# Patient Record
Sex: FEMALE | Race: WHITE | Employment: OTHER | URBAN - METROPOLITAN AREA
[De-identification: names, ages, dates, MRNs, and addresses within clinical notes are randomized per-mention and may not be internally consistent; named-entity substitution may affect disease eponyms.]

---

## 2018-02-22 ENCOUNTER — OFFICE VISIT (OUTPATIENT)
Dept: CARDIOLOGY CLINIC | Age: 73
End: 2018-02-22

## 2018-02-22 VITALS
BODY MASS INDEX: 28.13 KG/M2 | OXYGEN SATURATION: 98 % | WEIGHT: 149 LBS | HEIGHT: 61 IN | DIASTOLIC BLOOD PRESSURE: 60 MMHG | SYSTOLIC BLOOD PRESSURE: 100 MMHG | HEART RATE: 81 BPM | RESPIRATION RATE: 16 BRPM

## 2018-02-22 DIAGNOSIS — N18.30 STAGE 3 CHRONIC KIDNEY DISEASE (HCC): ICD-10-CM

## 2018-02-22 DIAGNOSIS — I10 ESSENTIAL HYPERTENSION: ICD-10-CM

## 2018-02-22 DIAGNOSIS — M79.89 BILATERAL SWELLING OF FEET: Primary | ICD-10-CM

## 2018-02-22 RX ORDER — IBANDRONATE SODIUM 150 MG/1
150 TABLET, FILM COATED ORAL
COMMUNITY
Start: 2018-01-03 | End: 2019-01-03

## 2018-02-22 RX ORDER — NAPROXEN SODIUM 220 MG
220 TABLET ORAL
COMMUNITY

## 2018-02-22 NOTE — MR AVS SNAPSHOT
[address] [facility] 
[phone] Patient: Maribell Bill MRN: [id number] SUP:[id number]/45/5742 Visit Information Date & Time Provider Department Dept. Phone Encounter #  
 2/22/2018  2:40 PM Adelfo Durham MD [facility] [phone] [id number] Upcoming Health Maintenance Date Due Hepatitis C Screening 1945 DTaP/Tdap/Td series (1 - Tdap) 1/10/1966 BREAST CANCER SCRN MAMMOGRAM 1/10/1995 FOBT Q 1 YEAR AGE 50-75 1/10/1995 ZOSTER VACCINE AGE 60> 11/10/2004 GLAUCOMA SCREENING Q2Y 1/10/2010 OSTEOPOROSIS SCREENING (DEXA) 1/10/2010 MEDICARE YEARLY EXAM 1/10/2010 Pneumococcal 65+ Low/Medium Risk (2 of 2 - PPSV23) 10/1/2016 Influenza Age 5 to Adult 8/1/2017 Allergies as of 2/22/2018  Review Complete On: 2/22/2018 By: Hayden Simpson No Known Allergies Current Immunizations  Never Reviewed Name Date Influenza Vaccine Split 10/1/2012 ZZZ-RETIRED (DO NOT USE) Pneumococcal Vaccine (Unspecified Type) 10/1/2011 Not reviewed this visit You Were Diagnosed With   
  
 Codes Comments Bilateral swelling of feet    -  Primary ICD-10-CM: M79.89 ICD-9-CM: 729.81 Stage 3 chronic kidney disease     ICD-10-CM: N18.3 ICD-9-CM: 610. 3 Essential hypertension     ICD-10-CM: I10 
ICD-9-CM: 401.9 Vitals BP Pulse Resp Height(growth percentile) Weight(growth percentile) SpO2  
 100/60 (BP 1 Location: Left arm, BP Patient Position: Sitting) 81 16 5' 1\" (1.549 m) 149 lb (67.6 kg) 98% BMI OB Status Smoking Status 28.15 kg/m2 Postmenopausal Never Smoker BMI and BSA Data Body Mass Index Body Surface Area  
 28.15 kg/m 2 1.71 m 2 Your Updated Medication List  
  
   
This list is accurate as of 2/22/18  3:03 PM.  Always use your most recent med list. amLODIPine-benazepril 5-20 mg per capsule Commonly known as:  Chloe Marrow Take 1 Cap by mouth daily. aspirin 81 mg chewable tablet Take 81 mg by mouth daily. Calcium-Cholecalciferol (D3) 600 mg(1,500mg) -400 unit Cap Take  by mouth. ibandronate 150 mg tablet Commonly known as:  Harman Captiva Take 150 mg by mouth. KLOR-CON M10 10 mEq tablet Generic drug:  potassium chloride Take 10 mEq by mouth daily. multivitamin tablet Commonly known as:  ONE A DAY Take 1 Tab by mouth daily. naproxen sodium 220 mg tablet Commonly known as:  NAPROSYN Take 220 mg by mouth. TRICOR 145 mg tablet Generic drug:  fenofibrate nanocrystallized Take 145 mg by mouth daily. We Performed the Following AMB POC EKG ROUTINE W/ 12 LEADS, INTER & REP [80136 CPT(R)] Patient Instructions You will need to follow up in clinic with Dr. Derik Krishnan in 1 year. Introducing [facility]! Fred Kay introduces Availigent patient portal. Now you can access parts of your medical record, email your doctor's office, and request medication refills online. 1. In your internet browser, go to [email or web address] 2. Click on the First Time User? Click Here link in the Sign In box. You will see the New Member Sign Up page. 3. Enter your Availigent Access Code exactly as it appears below. You will not need to use this code after youve completed the sign-up process. If you do not sign up before the expiration date, you must request a new code. · Availigent Access Code: [email or web address] Expires: 5/22/2018  5:24 PM 
 
4. Enter the last four digits of your Social Security Number (xxxx) and Date of Birth (mm/dd/yyyy) as indicated and click Submit. You will be taken to the next sign-up page. 5. Create a [employer] ID. This will be your Availigent login ID and cannot be changed, so think of one that is secure and easy to remember. 6. Create a Availigent password. You can change your password at any time. 7. Enter your Password Reset Question and Answer. This can be used at a later time if you forget your password. 8. Enter your e-mail address. You will receive e-mail notification when new information is available in [address] 9. Click Sign Up. You can now view and download portions of your medical record. 10. Click the Download Summary menu link to download a portable copy of your medical information. If you have questions, please visit the Frequently Asked Questions section of the Relatient website. Remember, Relatient is NOT to be used for urgent needs. For medical emergencies, dial 911. Now available from your iPhone and Android! Please provide this summary of care documentation to your next provider. Your primary care clinician is listed as Candy Bose. If you have any questions after today's visit, please call [phone].

## 2018-02-22 NOTE — PROGRESS NOTES
Liane Angulo     1945       Jordan Hernandez MD, [facility]  Date of Visit-2/22/2018   PCP is Ralston Alpers, MD   [facility] [place]  [facility]  HPI:  Tori Santillan is a 68 y.o. female   New patient to see us for cardiac checkup Ralston Alpers, MD in [place]    The pt states that she has noticed some swelling in her ankles this morning. She reports that she just had a physical and was taken off of HCTZ as it was affecting her kidney's and is now on amlodipine. The pt reports that she is very active and walks 4 miles a day. She denies having trouble laying flat at night. The pt states that she has a medication that she only takes once a month and she believes this may have a lot of sodium in it and attributes her leg swelling to this or to the amlodipine. Hx of CKD3 with creatine of 1.6-1.9. Denies chest pain, edema, syncope or shortness of breath at rest, has no tachycardia, palpitations or sense of arrhythmia. EKG: Sinus rhythm WNL     Assessment/Plan:     1. General cardiovascular health appears good she is walking 4 miles, has no angina shortness of breath and has a normal EKG she is a non smoker with a positive family hx of heart disease. 2. Mild edema for the last day or so this could be amlodipine ibandronate, if it persists we will stop the amlodipine and if still doesn't stop we will get an echo but I don't anticipate this to be cardiac in nature     3. CKD 3 now off HCTZ followed by Ralston Alpers, MD     4. HTN can continue amlodipine if it doesn't cause the swelling it is compatable with the lotrel   Future Appointments  Date Time Provider Wolfgang Nunez   2/19/2019 2:40 PM MD JAYCE Waters      [facility] CHF Meds             aspirin 81 mg chewable tablet  (Taking) Take 81 mg by mouth daily. amLODIPine-benazepril (LOTREL) 5-20 mg per capsule  (Taking) Take 1 Cap by mouth daily. 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 Impression:   1. Bilateral swelling of feet    2. Stage 3 chronic kidney disease    3. Essential hypertension        Review of Systems   Constitutional: Negative for diaphoresis, fever and malaise/fatigue. HENT: Negative for ear pain, hearing loss, nosebleeds and tinnitus. Eyes: Negative for blurred vision, double vision and pain. Respiratory: Negative for cough, hemoptysis, sputum production, shortness of breath, wheezing and stridor. Cardiovascular: Positive for leg swelling. Negative for chest pain, palpitations, orthopnea and claudication. Gastrointestinal: Negative for abdominal pain, blood in stool, constipation, diarrhea, heartburn, melena, nausea and vomiting. Genitourinary: Positive for frequency. Negative for dysuria and urgency. Musculoskeletal: Positive for joint pain. Negative for back pain, falls, myalgias and neck pain. Skin: Negative for rash. Neurological: Negative for dizziness, sensory change, seizures, loss of consciousness, weakness and headaches. Endo/Heme/Allergies: Does not bruise/bleed easily. Psychiatric/Behavioral: Negative for depression, hallucinations and memory loss. The patient is not nervous/anxious and does not have insomnia. see supplement sheet, initialed and to be scanned by staff  Past Medical History:   Diagnosis Date    Arthritis     Chronic pain     right hip    Hypertension     Spinal stenosis 8/31/2016      Social Hx= reports that she has never smoked. She has never used smokeless tobacco. She reports that she drinks about 8.4 oz of alcohol per week      Exam and Labs:  /60 (BP 1 Location: Left arm, BP Patient Position: Sitting)  Pulse 81  Resp 16  Ht 5' 1\" (1.549 m)  Wt 149 lb (67.6 kg)  SpO2 98%  BMI 28.15 kg/n5Puupsdygwqohqk:  NAD, comfortable  Head: NC,AT.  Eyes: No scleral icterus. Neck:  Neck supple. No JVD present. Throat: moist mucous membranes. Chest: Effort normal & normal respiratory excursion . Neurological: alert, conversant and oriented . Skin: Skin is not cold. No obvious systemic rash noted. Not diaphoretic. No erythema. Psychiatric:  Grossly normal mood and affect. Behavior appears normal. Extremities:  no clubbing or cyanosis. Abdomen: non distended    Lungs:breath sounds normal. No stridor. distress, wheezes or  Rales. Heart: normal rate, regular rhythm, normal S1, S2, no murmurs, rubs, clicks or gallops , PMI non displaced. Edema: Edema is 1+ bilateral to the knees. Wt Readings from Last 3 Encounters:   02/22/18 149 lb (67.6 kg)   08/31/16 140 lb (63.5 kg)   08/17/16 140 lb (63.5 kg)      BP Readings from Last 3 Encounters:   02/22/18 100/60   09/04/16 125/75   08/17/16 126/75      Current Outpatient Prescriptions   Medication Sig    ibandronate (BONIVA) 150 mg tablet Take 150 mg by mouth.  naproxen sodium (NAPROSYN) 220 mg tablet Take 220 mg by mouth.  aspirin 81 mg chewable tablet Take 81 mg by mouth daily.  fenofibrate nanocrystallized (TRICOR) 145 mg tablet Take 145 mg by mouth daily.  potassium chloride (KLOR-CON M10) 10 mEq tablet Take 10 mEq by mouth daily.  amLODIPine-benazepril (LOTREL) 5-20 mg per capsule Take 1 Cap by mouth daily.  Calcium-Cholecalciferol, D3, 600 mg(1,500mg) -400 unit cap Take  by mouth.  multivitamin (ONE A DAY) tablet Take 1 Tab by mouth daily.  oxyCODONE IR (ROXICODONE) 5 mg immediate release tablet Take 1 Tab by mouth every four (4) hours as needed. Max Daily Amount: 30 mg. Indications: PAIN    alendronate (FOSAMAX) 70 mg tablet Take 70 mg by mouth every seven (7) days.  hydrochlorothiazide (HYDRODIURIL) 25 mg tablet Take 25 mg by mouth daily. No current facility-administered medications for this visit. Impression see above.       Written by Kellee Kay, [address], as dictated by Trista Urban MD.

## 2018-03-01 ENCOUNTER — TELEPHONE (OUTPATIENT)
Dept: CARDIOLOGY CLINIC | Age: 73
End: 2018-03-01

## 2018-03-01 NOTE — TELEPHONE ENCOUNTER
Patient called in and stated that the Dr told her to call back in if she still was experiencing swelling in her ankles. She stated that the Dr told her he could switch out one of her meds. Thanks!   Phone [phone]

## 2018-03-02 NOTE — TELEPHONE ENCOUNTER
Verified patient with two types of identifiers. Patient states that she is still having swelling in her ankles. Has stopped diuretic HCTZ and amlodipine 5 mg, patient only took amlodipine for just a couple of days and has been off of it for 6 days now. . Patient is still taking Lotrel. Will ask MD and return call with recommendation.

## 2018-03-05 NOTE — TELEPHONE ENCOUNTER
Verified patient with two types of identifiers. Informed patient of below. Pt states that swelling is a little better over the weekend, will continue to monitor. Patient will contact office if swelling gets any worse to schedule with Jackie Jules or Sarath Lamar. Verbalizes understanding. And will call with any questions or concerns.

## 2019-02-21 ENCOUNTER — OFFICE VISIT (OUTPATIENT)
Dept: CARDIOLOGY CLINIC | Age: 74
End: 2019-02-21

## 2019-02-21 VITALS
HEIGHT: 61 IN | WEIGHT: 143 LBS | OXYGEN SATURATION: 96 % | RESPIRATION RATE: 16 BRPM | HEART RATE: 62 BPM | DIASTOLIC BLOOD PRESSURE: 62 MMHG | BODY MASS INDEX: 27 KG/M2 | SYSTOLIC BLOOD PRESSURE: 110 MMHG

## 2019-02-21 DIAGNOSIS — M79.89 BILATERAL SWELLING OF FEET: ICD-10-CM

## 2019-02-21 DIAGNOSIS — I10 ESSENTIAL HYPERTENSION: Primary | ICD-10-CM

## 2019-02-21 RX ORDER — FUROSEMIDE 20 MG/1
10 TABLET ORAL DAILY
COMMUNITY
Start: 2018-09-20 | End: 2019-09-20

## 2019-02-21 NOTE — PROGRESS NOTES
Liane Sosa Hush     1945       Jordan Almaguer MD, [facility]  Date of Visit-2/21/2019   PCP is Meenu Marti NP   [facility] [place]  [facility]  HPI:  Jayson Capellan is a 76 y.o. female   Seen last year with mild edema , no source. Returns for f/u. Pt has no more LE edema since last year   Denies chest pain, edema, syncope or shortness of breath at rest, has no tachycardia, palpitations or sense of arrhythmia. Lasix is cut back to 10 mg in past year  Last year taken off HCT due to effect on kidneys  EKG: Sinus rhythm within normal limits. Hx of CKD3 with creatine of 1.6-1.9  Assessment/Plan:     1. Swelling improved   Still on amlodipine as far as I can tell from her med list  Did not seem to have CHF, may have been salt intake    [facility] CAD CHF Meds             furosemide (LASIX) 20 mg tablet  (Taking) Take 10 mg by mouth daily. aspirin 81 mg chewable tablet  (Taking) Take 81 mg by mouth daily. fenofibrate nanocrystallized (TRICOR) 145 mg tablet  (Taking) Take 145 mg by mouth daily. amLODIPine-benazepril (LOTREL) 5-20 mg per capsule  (Taking) Take 1 Cap by mouth daily. 2. HTN well controlled. BP Readings from Last 3 Encounters:   02/21/19 110/62   02/22/18 100/60   09/04/16 125/75      3. F/u as needed. Impression:   1. Essential hypertension    2. Bilateral swelling of feet       Cardiac History:   No specialty comments available. ROS-except as noted above. . A complete cardiac and respiratory are reviewed and negative except as above ; Resp-denies wheezing  or productive cough,.  Const- No unusual weight loss or fever; Neuro-no recent seizure or CVA ; GI- No BRBPR, abdom pain, bloating ; - no  hematuria   see supplement sheet, initialed and to be scanned by staff  Past Medical History:   Diagnosis Date    Arthritis     Bilateral swelling of feet 2/22/2018    Chronic pain     right hip    Essential hypertension 2/22/2018    Hypertension     Spinal stenosis 8/31/2016    Stage 3 chronic kidney disease 2/22/2018      Social Hx= reports that  has never smoked. she has never used smokeless tobacco. She reports that she drinks about 8.4 oz of alcohol per week. Exam and Labs:  /62 (BP 1 Location: Left arm, BP Patient Position: Sitting)   Pulse 62   Resp 16   Ht 5' 1\" (1.549 m)   Wt 143 lb (64.9 kg)   SpO2 96%   BMI 27.02 kg/m² Constitutional:  NAD, comfortable  Head: NC,AT. Neck:  Neck supple. No JVD present. Chest: Effort normal & normal respiratory excursion . Neurological: alert, conversant and oriented . Skin: Skin is not cold. No obvious systemic rash noted. Not diaphoretic. No erythema. Psychiatric:  Grossly normal mood and affect. Behavior appears normal. Extremities:  no clubbing or cyanosis. Abdomen: non distended    Lungs:breath sounds normal. No stridor. distress, wheezes or  Rales. Heart: normal rate, regular rhythm, normal S1, S2, no murmurs, rubs, clicks or gallops , PMI non displaced. Edema: Edema is none. Wt Readings from Last 3 Encounters:   02/21/19 143 lb (64.9 kg)   02/22/18 149 lb (67.6 kg)   08/31/16 140 lb 0 oz (63.5 kg)         Current Outpatient Medications   Medication Sig    furosemide (LASIX) 20 mg tablet Take 10 mg by mouth daily.  denosumab (PROLIA) 60 mg/mL injection 60 mg by SubCUTAneous route.  naproxen sodium (NAPROSYN) 220 mg tablet Take 220 mg by mouth.  aspirin 81 mg chewable tablet Take 81 mg by mouth daily.  fenofibrate nanocrystallized (TRICOR) 145 mg tablet Take 145 mg by mouth daily.  potassium chloride (KLOR-CON M10) 10 mEq tablet Take 10 mEq by mouth daily.  amLODIPine-benazepril (LOTREL) 5-20 mg per capsule Take 1 Cap by mouth daily.  Calcium-Cholecalciferol, D3, 600 mg(1,500mg) -400 unit cap Take  by mouth.  multivitamin (ONE A DAY) tablet Take 1 Tab by mouth daily.        No current facility-administered medications for this visit. Impression see above.       Written by Ninfa Graham, as dictated by Author MD Jose G.

## 2019-02-21 NOTE — PROGRESS NOTES
Visit Vitals /62 (BP 1 Location: Left arm, BP Patient Position: Sitting) Pulse 62 Resp 16 Ht 5' 1\" (1.549 m) Wt 143 lb (64.9 kg) SpO2 96% BMI 27.02 kg/m²

## 2020-12-07 ENCOUNTER — OFFICE VISIT (OUTPATIENT)
Dept: CARDIOLOGY CLINIC | Age: 75
End: 2020-12-07
Payer: MEDICARE

## 2020-12-07 VITALS
OXYGEN SATURATION: 97 % | HEART RATE: 75 BPM | HEIGHT: 60 IN | DIASTOLIC BLOOD PRESSURE: 80 MMHG | RESPIRATION RATE: 16 BRPM | WEIGHT: 147 LBS | SYSTOLIC BLOOD PRESSURE: 120 MMHG | BODY MASS INDEX: 28.86 KG/M2

## 2020-12-07 DIAGNOSIS — I10 ESSENTIAL HYPERTENSION: ICD-10-CM

## 2020-12-07 DIAGNOSIS — M79.89 BILATERAL SWELLING OF FEET: Primary | ICD-10-CM

## 2020-12-07 PROCEDURE — 1101F PT FALLS ASSESS-DOCD LE1/YR: CPT | Performed by: SPECIALIST

## 2020-12-07 PROCEDURE — G8419 CALC BMI OUT NRM PARAM NOF/U: HCPCS | Performed by: SPECIALIST

## 2020-12-07 PROCEDURE — 99213 OFFICE O/P EST LOW 20 MIN: CPT | Performed by: SPECIALIST

## 2020-12-07 PROCEDURE — G8400 PT W/DXA NO RESULTS DOC: HCPCS | Performed by: SPECIALIST

## 2020-12-07 PROCEDURE — G8754 DIAS BP LESS 90: HCPCS | Performed by: SPECIALIST

## 2020-12-07 PROCEDURE — 3017F COLORECTAL CA SCREEN DOC REV: CPT | Performed by: SPECIALIST

## 2020-12-07 PROCEDURE — G8752 SYS BP LESS 140: HCPCS | Performed by: SPECIALIST

## 2020-12-07 PROCEDURE — G8427 DOCREV CUR MEDS BY ELIG CLIN: HCPCS | Performed by: SPECIALIST

## 2020-12-07 PROCEDURE — G8536 NO DOC ELDER MAL SCRN: HCPCS | Performed by: SPECIALIST

## 2020-12-07 PROCEDURE — 93000 ELECTROCARDIOGRAM COMPLETE: CPT | Performed by: SPECIALIST

## 2020-12-07 PROCEDURE — 1090F PRES/ABSN URINE INCON ASSESS: CPT | Performed by: SPECIALIST

## 2020-12-07 PROCEDURE — G8510 SCR DEP NEG, NO PLAN REQD: HCPCS | Performed by: SPECIALIST

## 2020-12-07 RX ORDER — AMLODIPINE AND BENAZEPRIL HYDROCHLORIDE 5; 40 MG/1; MG/1
1 CAPSULE ORAL DAILY
COMMUNITY
Start: 2020-10-02

## 2020-12-07 RX ORDER — FUROSEMIDE 20 MG/1
TABLET ORAL
COMMUNITY
Start: 2020-10-02

## 2020-12-07 RX ORDER — FUROSEMIDE 20 MG/1
TABLET ORAL
COMMUNITY
Start: 2020-10-02 | End: 2020-12-07 | Stop reason: SDUPTHER

## 2020-12-07 RX ORDER — LANOLIN ALCOHOL/MO/W.PET/CERES
50 CREAM (GRAM) TOPICAL DAILY
COMMUNITY

## 2020-12-07 NOTE — Clinical Note
12/7/20 Patient: Queta Cohen YOB: 1945 Date of Visit: 12/7/2020 Eusebio Adams NP 
[address] [address] [address] [address] VIA Facsimile: [phone] Dear Eusebio Adams NP, Thank you for referring Ms. Liane Marsh to [facility] for evaluation. My notes for this consultation are attached. If you have questions, please do not hesitate to call me. I look forward to following your patient along with you.  
 
 
Sincerely, 
 
Marjorie Estrada MD

## 2020-12-07 NOTE — PROGRESS NOTES
Page Ji Encarnacion     1945       Jordan Stein MD, [facility]  Date of Visit-12/7/2020   PCP is DARREN Mccracken [facility] [place]  [facility]  HPI:  Jesús Queen is a 76 y.o. female   1 year f/u for mild LE edema, HTN. Pt has CKD 3. In 2018 taken off HCTZ due to effect on kidneys. She has seen the [facility] for PCP. Pt is accompanied by her  who is also seeing us today. From a heart standpoint, pt is doing well. Pt notes she has nocturnal leg cramps and leg weakness but she no longer has any swelling. Musculoskeletal complaints follows with rheumatology now Dr. Freda Gallardo. Denies chest pain, edema, syncope or shortness of breath at rest, has no tachycardia, palpitations or sense of arrhythmia. Was previously walking 4 miles a day  EKG: Sinus  Rhythm     -RSR(V1) -nondiagnostic. Assessment/Plan:     1. Bilateral swelling of feet  Has resolved with changes in her BP meds with PCP  - AMB POC EKG ROUTINE W/ 12 LEADS, INTER & REP    2. Essential hypertension  Controlled. Asymptomatic. BP Readings from Last 6 Encounters:   12/07/20 120/80   02/21/19 110/62   02/22/18 100/60   09/04/16 125/75   08/17/16 126/75   12/13/12 123/71        Has CKD 3 with prior creatinine 1.6-1.9 and taken off of HCTZ  F/u in 1 year  Patient Instructions   We will see you back for an annual follow up. Future Appointments   Date Time Provider Wolfgang Nunez   12/10/2021 11:00 AM Jordan Betancourt MD [facility]          Impression:   1. Bilateral swelling of feet    2. Essential hypertension       Cardiac History:   No specialty comments available. ROS-except as noted above. Notes aching joints pain in upper arms weakness and leg pain legs leg cramps at night and is seeing [address] [place] [address] and Dr. Yaakov Avalos for arthritis.  A complete cardiac and respiratory are reviewed and negative except as above ; Resp-denies wheezing  or productive cough,. Const- No unusual weight loss or fever; Neuro-no recent seizure or CVA ; GI- No BRBPR, abdom pain, bloating ; - no  hematuria   see supplement sheet, initialed and to be scanned by staff  Past Medical History:   Diagnosis Date    Arthritis     Bilateral swelling of feet 2/22/2018    Chronic pain     right hip    Essential hypertension 2/22/2018    Hypertension     Spinal stenosis 8/31/2016    Stage 3 chronic kidney disease ([address] 2/22/2018      Social Hx= reports that she has never smoked. She has never used smokeless tobacco. She reports current alcohol use of about 14.0 standard drinks of alcohol per week. Exam and Labs:  /80 (BP 1 Location: Left arm, BP Patient Position: Sitting)   Pulse 75   Resp 16   Ht 5' (1.524 m)   Wt 147 lb (66.7 kg)   SpO2 97%   BMI 28.71 kg/m² Constitutional:  NAD, comfortable  Head: NC,AT. Eyes: No scleral icterus. Neck:  Neck supple. No JVD present. Throat: moist mucous membranes. Chest: Effort normal & normal respiratory excursion . Neurological: alert, conversant and oriented . Skin: Skin is not cold. No obvious systemic rash noted. Not diaphoretic. No erythema. Psychiatric:  Grossly normal mood and affect. Behavior appears normal. Extremities:  no clubbing or cyanosis. Abdomen: non distended    Lungs:breath sounds normal. No stridor. distress, wheezes or  Rales. Heart: normal rate, regular rhythm, normal S1, S2, no murmurs, rubs, clicks or gallops , PMI non displaced. Edema: Edema is none.   No results found for: CHOL, CHOLX, CHLST, CHOLV, HDL, HDLP, LDL, LDLC, DLDLP, TGLX, TRIGL, TRIGP, CHHD, CHHDX  Lab Results   Component Value Date/Time    Sodium 135 (L) 09/02/2016 03:00 AM    Potassium 3.9 09/02/2016 03:00 AM    Chloride 103 09/02/2016 03:00 AM    CO2 23 09/02/2016 03:00 AM    Anion gap 9 09/02/2016 03:00 AM    Glucose 123 (H) 09/02/2016 03:00 AM    BUN 17 09/02/2016 03:00 AM    Creatinine 0.82 09/02/2016 03:00 AM BUN/Creatinine ratio 21 (H) 09/02/2016 03:00 AM    GFR est AA >60 09/02/2016 03:00 AM    GFR est non-AA >60 09/02/2016 03:00 AM    Calcium 7.8 (L) 09/02/2016 03:00 AM      Wt Readings from Last 3 Encounters:   12/07/20 147 lb (66.7 kg)   02/21/19 143 lb (64.9 kg)   02/22/18 149 lb (67.6 kg)      BP Readings from Last 3 Encounters:   12/07/20 120/80   02/21/19 110/62   02/22/18 100/60      Current Outpatient Medications   Medication Sig    furosemide (LASIX) 20 mg tablet TAKE 1/2 TABLET BY MOUTH ONCE DAILY.  amLODIPine-benazepril (LOTREL) 5-40 mg per capsule Take 1 Cap by mouth daily.  cannabidiol, CBD, (CANNABIDIOL PO) Take 30 mg by mouth daily.  denosumab (PROLIA) 60 mg/mL injection 60 mg by SubCUTAneous route.  aspirin 81 mg chewable tablet Take 81 mg by mouth daily.  fenofibrate nanocrystallized (TRICOR) 145 mg tablet Take 145 mg by mouth daily.  potassium chloride (KLOR-CON M10) 10 mEq tablet Take 10 mEq by mouth daily.  Calcium-Cholecalciferol, D3, 600 mg(1,500mg) -400 unit cap Take  by mouth.  multivitamin (ONE A DAY) tablet Take 1 Tab by mouth daily.  pyridoxine, vitamin B6, (VITAMIN B-6) 50 mg tablet Take 50 mg by mouth daily.  naproxen sodium (NAPROSYN) 220 mg tablet Take 220 mg by mouth.  amLODIPine-benazepril (LOTREL) 5-20 mg per capsule Take 1 Cap by mouth daily. No current facility-administered medications for this visit. Impression see above.       Written by Minal Panchal, as dictated by Donna Mcdonald MD.

## 2020-12-07 NOTE — PROGRESS NOTES
Visit Vitals  /80 (BP 1 Location: Left arm, BP Patient Position: Sitting)   Pulse 75   Resp 16   Ht 5' (1.524 m)   Wt 147 lb (66.7 kg)   SpO2 97%   BMI 28.71 kg/m²